# Patient Record
Sex: FEMALE
[De-identification: names, ages, dates, MRNs, and addresses within clinical notes are randomized per-mention and may not be internally consistent; named-entity substitution may affect disease eponyms.]

---

## 2023-07-10 PROBLEM — Z00.00 ENCOUNTER FOR PREVENTIVE HEALTH EXAMINATION: Status: ACTIVE | Noted: 2023-07-10

## 2023-07-11 ENCOUNTER — APPOINTMENT (OUTPATIENT)
Dept: UROLOGY | Facility: CLINIC | Age: 28
End: 2023-07-11
Payer: COMMERCIAL

## 2023-07-11 VITALS
BODY MASS INDEX: 21 KG/M2 | HEART RATE: 74 BPM | HEIGHT: 71 IN | SYSTOLIC BLOOD PRESSURE: 116 MMHG | WEIGHT: 150 LBS | DIASTOLIC BLOOD PRESSURE: 81 MMHG | OXYGEN SATURATION: 97 % | TEMPERATURE: 98 F

## 2023-07-11 DIAGNOSIS — R35.0 FREQUENCY OF MICTURITION: ICD-10-CM

## 2023-07-11 LAB
BILIRUB UR QL STRIP: NORMAL
CLARITY UR: CLEAR
COLLECTION METHOD: NORMAL
GLUCOSE UR-MCNC: NORMAL
HCG UR QL: 0.2 EU/DL
HGB UR QL STRIP.AUTO: NORMAL
KETONES UR-MCNC: NORMAL
LEUKOCYTE ESTERASE UR QL STRIP: NORMAL
NITRITE UR QL STRIP: NORMAL
PH UR STRIP: 6.5
PROT UR STRIP-MCNC: NORMAL
SP GR UR STRIP: 1.01

## 2023-07-11 PROCEDURE — 99203 OFFICE O/P NEW LOW 30 MIN: CPT

## 2023-07-11 PROCEDURE — 81003 URINALYSIS AUTO W/O SCOPE: CPT | Mod: QW

## 2023-07-18 LAB
MYCOPLASMA HOMINIS CULTURE: NEGATIVE
UREAPLASMA CULTURE: NEGATIVE

## 2023-07-18 NOTE — HISTORY OF PRESENT ILLNESS
[FreeTextEntry1] : Language: English\par Date of First visit: 2023\par Accompanied by: Self\par Contact info: ***\par Referring Provider/PCP: Denies\par \par \par \par CC/ Problem List:\par \par ===============================================================================\par FIRST VISIT:\par The patient is a 28 year female who first presents 2023 for UTI symptoms.\par \par She states in early  she had recurring UTI symptoms. Her symptoms resolved around 2023. At the end of May 2023 she started getting the same symptoms. She felt she had to go all the time, but she denies dysuria, hematuria, fever. She felt some squeezing pain.\par \par She saw 4 urologists. She saw Luis Carlos Tsang and ?"some other rosey". She had a renal sono that was normal. She was told her urine was negative.\par She went to an Ob gyn and she saw Tammie Morel and she was told that "urea" could sometimes irritate people. She was tested for BV/yeast and STDs and this was normal. She was given Doxycycline and within a day she felt better. She has felt "way better".  On further questioning, it seems she was tested for Ureaplasma and Mycoplasma. She thinks she tested positive for this. \par \par She states around end of  her symptoms slightly started again. She does not fully normal. She feels that she "notices" her bladder. She does not usually notice her bladder. She feels her urethra is irritated slightly. She takes D-mannose, Cranberry and Vit C. \par \par She has no other problems voiding. She sometimes feels she has a weaker stream. It depends on the day. She denies constipation. She has not had intercourse in a while. \par \par She is not on special diets, no coffee, tea, rare soda, rare sparkling water, rare ETOH. She normally drinks just water. \par \par -------------------------------------------------------------------------------------------\par INTERVAL VISITS:\par \par \par ===============================================================================\par \par PMH: Denies\par PSH: Denies\par POBH: (if applicable) , periods are regular, \par FH: \par \par ALL: NKDA\par MEDS: Vit C, Spironolactone for acne, Sprintec, Zinc, D-mannose and Cranberry \par SOC: Denies Tob, Social ETOH, Denies drugs\par \par \par ROS: Review of Systems is as per HPI unless otherwise denoted below\par \par \par ===============================================================================\par DATA: \par \par LABS:-------------------------------------------------------------------------------------------------------------------\par 2023: UA dip negative\par \par \par RADS:-------------------------------------------------------------------------------------------------------------------\par \par \par \par PATHOLOGY/CYTOLOGY:-------------------------------------------------------------------------------------------\par \par \par \par VOIDING STUDIES: ----------------------------------------------------------------------------------------------------\par 2023: PVR 0cc\par \par \par STONE STUDIES: (Analysis/LLSA)----------------------------------------------------------------------------------\par \par \par \par PROCEDURES: -----------------------------------------------------------------------------------------------\par \par \par \par \par ===============================================================================\par \par PHYSICAL EXAM:\par \par GEN: AAOx3, NAD, Habitus: normal\par \par BARRIERS to CARE: none\par \par PSYCH: Appropriate Behavior, Affect Congruent\par \par HEENT: AT/NC Trachea midline. \par \par Lungs: No labored breathing.\par \par NEURO: + Movement, all 4 extremities grossly intact without deficits. No tremors.\par \par SKIN: Warm dry. No visible rashes or ulcers\par \par GAIT: Gait normal, Stability good\par \par \par =======================================================================================\par \par \par \par ASSESSMENT and PLAN\par \par The patient is a 28 year female with a history of the following:\par \par 1. A symptomatic ureaplasma infection\par \par I told her in accordance with CDC guidelines we don't usually treat ureaplasma, but it sounds like it helped and there are likely exceptions to every rule. She seems nervous that it may have recurred and I told her even if it did I'm not sure I would retreat her.\par \par We can recheck her and I recommended that she stop the cranberry and Vit C which can sometimes be irritating and consider a Triple Magnesium (which tends to relax muscles). I also recommended Florafemme as a way to help prevent UTI's.\par \par We discussed the above and her questions were answered.\par \par -----------------------------------------------------------------------------------------------------\par LABS/TESTS Ordered: U/M\par Meds Ordered:\par Follow up: PRN\par -----------------------------------------------------------------------------------------------------\par \par The total amount of time I have personally spent preparing for this visit, reviewing the patient's test results, obtaining external history, ordering tests/medications, documenting clinical information, communicating with and counseling the patient/family and/or caregiver(s), and spent face to face with the patient explaining the above was  40 minutes.\par \par \par Thank you for allowing me to assist in the care of your patient. Should you have any questions please do not hesitate to reach out to me.\par \par \par Anais Franco MD\par Associate \par Department of Urology\par [facility]\par Phone: [phone]\par Fax: [phone]\par \par [address]\[facility] [place] [address]\par

## 2023-07-18 NOTE — ADDENDUM
[FreeTextEntry1] : 7/11/2023: U/M culture negative\par \par (Dox text sent stating urine tests were normal and available on portal)